# Patient Record
Sex: FEMALE | URBAN - METROPOLITAN AREA
[De-identification: names, ages, dates, MRNs, and addresses within clinical notes are randomized per-mention and may not be internally consistent; named-entity substitution may affect disease eponyms.]

---
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
## 2024-05-20 ENCOUNTER — OFFICE VISIT (OUTPATIENT)
Dept: INTERNAL MEDICINE | Facility: CLINIC | Age: 19
End: 2024-05-20
Payer: COMMERCIAL

## 2024-05-20 VITALS
OXYGEN SATURATION: 100 % | DIASTOLIC BLOOD PRESSURE: 70 MMHG | BODY MASS INDEX: 20.49 KG/M2 | WEIGHT: 120 LBS | SYSTOLIC BLOOD PRESSURE: 118 MMHG | TEMPERATURE: 98 F | HEIGHT: 64 IN | HEART RATE: 70 BPM

## 2024-05-20 DIAGNOSIS — Z12.4 SCREENING FOR CERVICAL CANCER: ICD-10-CM

## 2024-05-20 DIAGNOSIS — R41.840 LACK OF CONCENTRATION: Primary | ICD-10-CM

## 2024-05-20 PROCEDURE — 99203 OFFICE O/P NEW LOW 30 MIN: CPT | Performed by: NURSE PRACTITIONER

## 2024-05-20 NOTE — PROGRESS NOTES
"Subjective   Chief Complaint   Patient presents with    [facility]    concerned she may have ADHD       Aleida Strong is a 18 y.o. female presents with a concern for ADHD.  She states that anyone she has ever come in contact with has told her that she likely has ADHD but she has never been diagnosed.  She's uncertain if she has any hyperactivity.  Has a hard time focusing and completing task.  Gets off track easily.  She denies any depression or anxiety.        Review of Systems   Constitutional:  Negative for activity change, appetite change and fatigue.   HENT:  Negative for congestion.    Respiratory:  Negative for cough and shortness of breath.    Cardiovascular:  Negative for chest pain and leg swelling.   Gastrointestinal:  Negative for abdominal pain.   Neurological:  Negative for dizziness, weakness and confusion.   Psychiatric/Behavioral:  Positive for decreased concentration. Negative for behavioral problems, dysphoric mood, negative for hyperactivity and depressed mood. The patient is not nervous/anxious.        Past Medical History:   Diagnosis Date    Acid reflux     Eczema      Past Surgical History:   Procedure Laterality Date    KNEE SURGERY      WISDOM TOOTH EXTRACTION       Family History   Problem Relation Age of Onset    No Known Problems Mother     Hypertension Father     Learning disabilities Paternal Grandmother     Hypertension Paternal Grandmother      Social History     Tobacco Use   Smoking Status Never   Smokeless Tobacco Never      Social History     Substance and Sexual Activity   Alcohol Use Yes    Comment: Rare      No current outpatient medications on file prior to visit.     No current facility-administered medications on file prior to visit.     No Known Allergies    Objective   Vitals:    05/20/24 0815   BP: 118/70   BP Location: Left arm   Patient Position: Sitting   Pulse: 70   Temp: 98 °F (36.7 °C)   SpO2: 100%   Weight: 54.4 kg (120 lb)   Height: 162.6 cm (64\")     Body " mass index is 20.6 kg/m².    Physical Exam  Vitals and nursing note reviewed.   Constitutional:       Appearance: Normal appearance. She is normal weight.   HENT:      Head: Normocephalic.   Eyes:      Pupils: Pupils are equal, round, and reactive to light.   Cardiovascular:      Rate and Rhythm: Normal rate and regular rhythm.      Pulses: Normal pulses.      Heart sounds: Normal heart sounds.   Pulmonary:      Effort: Pulmonary effort is normal. No respiratory distress.      Breath sounds: Normal breath sounds.   Skin:     General: Skin is warm and dry.      Capillary Refill: Capillary refill takes less than 2 seconds.   Neurological:      General: No focal deficit present.      Mental Status: She is alert and oriented to person, place, and time.      Gait: Gait is intact.   Psychiatric:         Attention and Perception: Attention normal.         Mood and Affect: Mood normal.         Behavior: Behavior normal.         Thought Content: Thought content normal.         Judgment: Judgment normal.       BMI is within normal parameters. No other follow-up for BMI required.       Assessment & Plan   Problem List Items Addressed This Visit    None  Visit Diagnoses       Lack of concentration    -  Primary    Relevant Orders    Ambulatory Referral to Behavioral Health (Completed)    Screening for cervical cancer        Relevant Orders    Ambulatory Referral to Obstetrics / Gynecology           Refer to behavioral health  Pt does not wish to have labs done at this time  Refer for pap    No current outpatient medications on file.       Plan of care reviewed with the patient at the conclusion of today's visit.  Education was provided regarding diagnosis, management, and any prescribed or recommended OTC medications.  Patient verbalized understanding of and agreement with management plan.       AMY Carias

## 2024-06-04 ENCOUNTER — TELEPHONE (OUTPATIENT)
Dept: INTERNAL MEDICINE | Facility: CLINIC | Age: 19
End: 2024-06-04

## 2024-06-04 NOTE — TELEPHONE ENCOUNTER
Caller: AVERY SEAMAN    Relationship: Mother    Best call back number: [phone]     What is the best time to reach you: ANYTIME    Who are you requesting to speak with (clinical staff, provider,  specific staff member):     Do you know the name of the person who called: NA    What was the call regarding: PATIENT MOTHER CALLING AND STATED THEY OFFICE VISIT ON 05/20/2024 WAS CODED WRONG. PATIENT MOTHER CONTACTED THE BILLING DEPARTMENT AND THEY STATED THAT IT WAS THE OFFICE THAT CODED IT WRONG. PLEASE CALL TO DISCUSS.     Is it okay if the provider responds through MyChart: NO

## 2024-06-04 NOTE — TELEPHONE ENCOUNTER
Called left  for someone to call back    Reviewing the note and billing was correct. 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 It was not a physical or annual exam

## 2024-08-05 ENCOUNTER — TELEPHONE (OUTPATIENT)
Dept: INTERNAL MEDICINE | Facility: CLINIC | Age: 19
End: 2024-08-05
Payer: COMMERCIAL

## 2024-08-05 NOTE — TELEPHONE ENCOUNTER
-Mother left message regarding the bill  -Sent a request to bill as a physical 6/4  -Had Ayde review with me, it had not been rebilled as a physical.  - I emailed Marta Medina and asked her to rebill as a physical per patients call requesting a physical for the appt visit when scheduling.   -called and spoke to Aleida and updated her.

## 2024-11-11 NOTE — PROGRESS NOTES
Subjective   Chief Complaint   Patient presents with    Gynecologic Exam     New GYN, [facility].  1st GYN exam.      Aleida Strong is a 19 y.o. year old  presenting to be seen for her annual exam.  She has completed her HPV vaccine series.  She is interested in discussing birth control options today.  She is not ready to start hormonal birth control but has never used any birth control in the past and would like to know options.  She is currently going through a break-up with her long-term boyfriend.  This is the same partner she was with when they had an EAB.  This was in 2022.  She states she feels like she has not fully processed the emotional burden of this procedure.  She does voice some regret.  She has not been to counseling but is interested in this.  She is also concerned she may have ADHD.  She is not interested in medication but would like a formal diagnosis if she does have ADHD.  She is working as a .  Her and her boyfriend are  as they have different life goals.  She ideally would like to live on a farm and have a farm lifestyle that she grew up [place] farm.  He is currently [place] and is planning to go to [employer] and she just feels they have different interest at this time.  Although the break-up is amicable she is having conflicting feelings about this decision.  She did have unprotected intercourse about a week ago.  She is concern for possible pregnancy but understands that it is too early to test.  SEXUAL Hx:  She is currently sexually active.  In the past year there there has been NO new sexual partners.    Condoms are never used.  She would not like to be screened for STD's at today's exam.  Current birth control method: not using any form of contraception and does not wish to get pregnant.  She is not happy with her current method of contraception and does want to discuss alternative methods of contraception.  MENSTRUAL Hx:  Patient's  "last menstrual period was 10/21/2024 (exact date).  In the past 6 months her cycles have been regular, predictable and occur monthly.  Her menstrual flow is typically normal.   Each month on average there are roughly 0 day(s) of very heavy flow.    Intermenstrual bleeding is absent.    Post-coital bleeding is absent.  Dysmenorrhea: is not affecting her activities of daily living  PMS: is not affecting her activities of daily living  Her cycles are not a source of concern for her that she wishes to discuss today.  HEALTH Hx:     She has concerns about domestic violence: no.  OTHER THINGS SHE WANTS TO DISCUSS TODAY:  Nothing else    The following portions of the patient's history were reviewed and updated as appropriate:problem list, current medications, allergies, past family history, past medical history, past social history, and past surgical history.    Social History    Tobacco Use      Smoking status: Never      Smokeless tobacco: Never    Review of Systems  Constitutional POS: nothing reported    NEG: anorexia or night sweats   Genitourinary POS: nothing reported    NEG: dysuria or hematuria      Gastointestinal POS: nothing reported    NEG: bloating, change in bowel habits, melena, or reflux symptoms   Integument POS: nothing reported    NEG: moles that are changing in size, shape, color or rashes   Breast POS: nothing reported    NEG: persistent breast lump, skin dimpling, or nipple discharge        Objective   BP 92/62   Ht 164.5 cm (64.75\")   Wt 55.1 kg (121 lb 6.4 oz)   LMP 10/21/2024 (Exact Date)   Breastfeeding No   BMI 20.36 kg/m²     General:  well developed; well nourished  no acute distress  mentation appropriate   Skin:  No suspicious lesions seen   Thyroid: normal to inspection and palpation   Breasts:  Examined in supine position  Symmetric without masses or skin dimpling  Nipples normal without inversion, lesions or discharge  There are no palpable axillary nodes   Abdomen: soft, non-tender; " no masses  no umbilical or inguinal hernias are present  no hepato-splenomegaly   Pelvis: Clinical staff was present for exam  :  urethral meatus normal;  Vaginal:  normal pink mucosa without prolapse or lesions.  Cervix:  normal appearance.  Uterus:  normal size, shape and consistency.  Adnexa:  normal bimanual exam of the adnexa.  Rectal:  digital rectal exam not performed; anus visually normal appearing.        Assessment   Well woman with routine gynecological exam  Has completed HPV vaccine series.  Contraceptive education     Plan     Pap was done today.  If she does not receive the results of the Pap within 2 weeks  time, she was instructed to call to find out the results.  I explained to Aleida that the recommendations for Pap smear interval in a low risk patient's has lengthened to 3 years time.  I encouraged her to be seen yearly for a full physical exam including breast and pelvic exam even during the off years when PAP's will not be performed.  Discussed hormonal and nonhormonal birth control options with patient.  She will consider options and notify provider if she would like to start any contraception.  No contraindications to ASUNCION's identified discussed continued use of condoms to decrease risk of STI transmission.  Referral sent for mental health provider.  [facility] given for [facility].  The importance of keeping all planned follow-up and taking all medications as prescribed was emphasized.  Today I discussed with Aleida the total recommended calcium intake for a premenopausal female is 1000 mg.  Ideally this should be from dietary sources.  I reviewed calcium content in various foods including milk, fortified orange juice and yogurt.  If she cannot get sufficient calcium through dietary means, it is recommended to supplement with either a multivitamin or calcium to reach her daily goal.  I also reviewed the difference in the bioavailability of calcium carbonate and calcium  citrate containing supplements and the importance of taking calcium carbonate containing products with food.  Finally, vitamin D's role in calcium absorption was reviewed and a total daily vitamin D intake of 800 units was recommended.  She has completed her HPV vaccine series  Follow up for annual exam 1 year  If she chooses to start hormonal contraception will follow up in office in 3 months after medication start.    No orders of the defined types were placed in this encounter.         This note was electronically signed.    Sharon Subramanian, AMY  November 15, 2024

## 2024-11-15 ENCOUNTER — OFFICE VISIT (OUTPATIENT)
Dept: OBSTETRICS AND GYNECOLOGY | Facility: CLINIC | Age: 19
End: 2024-11-15
Payer: COMMERCIAL

## 2024-11-15 VITALS
WEIGHT: 121.4 LBS | SYSTOLIC BLOOD PRESSURE: 92 MMHG | HEIGHT: 65 IN | BODY MASS INDEX: 20.23 KG/M2 | DIASTOLIC BLOOD PRESSURE: 62 MMHG

## 2024-11-15 DIAGNOSIS — Z01.419 WELL WOMAN EXAM WITH ROUTINE GYNECOLOGICAL EXAM: Primary | ICD-10-CM

## 2024-11-15 DIAGNOSIS — Z71.1 MENTAL HEALTH-RELATED COMPLAINT: ICD-10-CM

## 2024-11-15 RX ORDER — RUXOLITINIB 15 MG/G
1 CREAM TOPICAL AS NEEDED
COMMUNITY

## 2024-11-18 LAB — REF LAB TEST METHOD: NORMAL

## 2024-12-19 ENCOUNTER — TELEMEDICINE (OUTPATIENT)
Dept: PSYCHIATRY | Facility: CLINIC | Age: 19
End: 2024-12-19
Payer: COMMERCIAL

## 2024-12-19 DIAGNOSIS — F43.21 GRIEF: ICD-10-CM

## 2024-12-19 DIAGNOSIS — F41.1 GENERALIZED ANXIETY DISORDER: Primary | ICD-10-CM
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
## 2025-01-22 PROBLEM — F41.1 GENERALIZED ANXIETY DISORDER: Status: ACTIVE | Noted: 2025-01-22

## 2025-01-22 PROBLEM — F43.21 GRIEF: Status: ACTIVE | Noted: 2025-01-22

## 2025-01-27 ENCOUNTER — TELEMEDICINE (OUTPATIENT)
Dept: PSYCHIATRY | Facility: CLINIC | Age: 20
End: 2025-01-27
Payer: COMMERCIAL

## 2025-01-27 DIAGNOSIS — F41.1 GENERALIZED ANXIETY DISORDER: Primary | ICD-10-CM

## 2025-01-27 NOTE — PLAN OF CARE
Refer to care plan   Multi-Disciplinary Problems (from Behavioral Health Treatment Plan)      Active Problems       Problem: Anxiety  Start Date: 01/27/25      Problem Details: The patient self-scales this problem as a 9 with 10 being the worst; patient states that she will become so anxious that she gets nauseated and will overthink everything and feel uncomfortable in her own skin.          Goal Priority Start Date Expected End Date End Date    Patient will develop and implement behavioral and cognitive strategies to reduce anxiety and irrational fears. -- 01/27/25 07/28/25 --    Goal Details: Progress toward goal:  Not appropriate to rate progress toward goal since this is the initial treatment plan.          Goal Intervention Frequency Start Date End Date    Help patient explore past emotional issues in relation to present anxiety. Q2 Weeks 01/27/25 --    Intervention Details: Duration of treatment until discharged.          Goal Intervention Frequency Start Date End Date    Help patient develop an awareness of their cognitive and physical responses to anxiety. Q2 Weeks 01/27/25 --    Intervention Details: Duration of treatment until discharged.                  Problem: Ineffective Communication  Start Date: 01/27/25      Problem Details: The patient self-scales this problem as a 8 with 10 being the worst; patient states that she overshares and tries to respond quickly to any communication from others and this can be very frustrating for the patient when she is not given the same type of communication.          Goal Priority Start Date Expected End Date End Date    Patient will demonstrate the ability to initiate new communication patterns outside of sessions on a consistent basis. -- 01/27/25 07/28/25 --    Goal Details: Progress toward goal:  Not appropriate to rate progress toward goal since this is the initial treatment plan.          Goal Intervention Frequency Start Date End Date    Encourage  understanding of past experiences that affect current communication style and educate about healthy communication patterns. Q2 Weeks 01/27/25 --    Intervention Details: Duration of treatment until discharged.                  Problem: Relationship Issues  Start Date: 01/27/25      Problem Details: The patient self-scales this problem as a 5 with 10 being the worst; patient struggles with boundaries and what is and isn't appropriate for her to share things and trying to be more considerate of others.          Goal Priority Start Date Expected End Date End Date    Patient will initiate personal change to improve relationships. -- 01/27/25 07/28/25 --    Goal Details: Progress toward goal:  Not appropriate to rate progress toward goal since this is the initial treatment plan.          Goal Intervention Frequency Start Date End Date    Assist patient in identifying behaviors that focus on relationship building and process the changes needed to improve relationships. Q2 Weeks 01/27/25 --    Intervention Details: Duration of treatment until discharged.                          Reviewed By       Ellie Ledbetter LPCC 01/27/25 1122
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
## 2025-01-27 NOTE — TREATMENT PLAN
Multi-Disciplinary Problems (from Behavioral Health Treatment Plan)      Active Problems       Problem: Anxiety  Start Date: 01/27/25      Problem Details: The patient self-scales this problem as a 9 with 10 being the worst; patient states that she will become so anxious that she gets nauseated and will overthink everything and feel uncomfortable in her own skin.          Goal Priority Start Date Expected End Date End Date    Patient will develop and implement behavioral and cognitive strategies to reduce anxiety and irrational fears. -- 01/27/25 07/28/25 --    Goal Details: Progress toward goal:  Not appropriate to rate progress toward goal since this is the initial treatment plan.          Goal Intervention Frequency Start Date End Date    Help patient explore past emotional issues in relation to present anxiety. Q2 Weeks 01/27/25 --    Intervention Details: Duration of treatment until discharged.          Goal Intervention Frequency Start Date End Date    Help patient develop an awareness of their cognitive and physical responses to anxiety. Q2 Weeks 01/27/25 --    Intervention Details: Duration of treatment until discharged.                  Problem: Ineffective Communication  Start Date: 01/27/25      Problem Details: The patient self-scales this problem as a 8 with 10 being the worst; patient states that she overshares and tries to respond quickly to any communication from others and this can be very frustrating for the patient when she is not given the same type of communication.          Goal Priority Start Date Expected End Date End Date    Patient will demonstrate the ability to initiate new communication patterns outside of sessions on a consistent basis. -- 01/27/25 07/28/25 --    Goal Details: Progress toward goal:  Not appropriate to rate progress toward goal since this is the initial treatment plan.          Goal Intervention Frequency Start Date End Date    Encourage understanding of past experiences  that affect current communication style and educate about healthy communication patterns. Q2 Weeks 01/27/25 --    Intervention Details: Duration of treatment until discharged.                  Problem: Relationship Issues  Start Date: 01/27/25      Problem Details: The patient self-scales this problem as a 5 with 10 being the worst; patient struggles with boundaries and what is and isn't appropriate for her to share things and trying to be more considerate of others.          Goal Priority Start Date Expected End Date End Date    Patient will initiate personal change to improve relationships. -- 01/27/25 07/28/25 --    Goal Details: Progress toward goal:  Not appropriate to rate progress toward goal since this is the initial treatment plan.          Goal Intervention Frequency Start Date End Date    Assist patient in identifying behaviors that focus on relationship building and process the changes needed to improve relationships. Q2 Weeks 01/27/25 --    Intervention Details: Duration of treatment until discharged.                          Reviewed By       Ellie Ledbetter LPCC 01/27/25 [id number]                     I have discussed and reviewed this treatment plan with the patient.

## 2025-02-18 ENCOUNTER — TELEMEDICINE (OUTPATIENT)
Dept: PSYCHIATRY | Facility: CLINIC | Age: 20
End: 2025-02-18
Payer: COMMERCIAL

## 2025-02-18 DIAGNOSIS — F41.1 GENERALIZED ANXIETY DISORDER: Primary | ICD-10-CM
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
## 2025-03-05 ENCOUNTER — TELEMEDICINE (OUTPATIENT)
Dept: PSYCHIATRY | Facility: CLINIC | Age: 20
End: 2025-03-05
Payer: COMMERCIAL

## 2025-03-05 DIAGNOSIS — F41.1 GENERALIZED ANXIETY DISORDER: Primary | ICD-10-CM

## 2025-03-05 DIAGNOSIS — F33.0 MILD EPISODE OF RECURRENT MAJOR DEPRESSIVE DISORDER: ICD-10-CM

## 2025-03-05 PROCEDURE — 90837 PSYTX W PT 60 MINUTES: CPT | Performed by: COUNSELOR

## 2025-03-05 NOTE — PROGRESS NOTES
Date: March 17, 2025  Time In: 10:26 AM  Time Out: 11:30 AM   This provider is located at the [facility]), [address], [place] [address] using a secure [employer] Video Visit through [employer]. Patient is being seen remotely via telehealth at home address in [place] and stated they are in a secure environment for this session. The patient's condition being diagnosed/treated is appropriate for telemedicine. The provider identified herself as well as her credentials. The patient, and/or patients guardian, consent to be seen remotely, and when consent is given they understand that the consent allows for patient identifiable information to be sent to a third party as needed. They may refuse to be seen remotely at any time. The electronic data is encrypted and password protected, and the patient and/or guardian has been advised of the potential risks to privacy not withstanding such measures.   You have chosen to receive care through a telehealth visit.  Do you consent to use a video/audio connection for your medical care today? Yes  Mode of Visit: Video  Location of patient: -HOME-  Location of provider: +HOME+  You have chosen to receive care through a telehealth visit.  The patient has signed the video visit consent form.  The visit included audio and video interaction. No technical issues occurred during this visit.    PROGRESS NOTE  Data:  Aleida Strong is a 19 y.o. female who presents today for follow up. Patient states that she had a good trip with her father and didn't want to have to come back to [place]. Patient states that since she has been back she has been pretty busy at work. Patient states that she has a wedding scheduled this weekend but states that as the months go on things will get busier for her. 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 Patient states that she loves the layout and the [place] of the land and feels that it could be a good fit for her some day. Patient states that while she was gone on her trip she messaged with her boyfriend while she was traveling but states that during the trip she stayed busy with spending time with her father. Patient states that while she was on the trip another friend told him that a cedrick from a local basketball team had messaged the patient while they were not really talking and was trying to connect with her under the impression of trying to get an appointment for a haircut but then it turned to the man trying to hook up with her which the patient rejected the advances but didn't tell her estranged boyfriend about the interaction.  Patient states that she is not mad that the friend told her estranged boyfriend about the situation but states that she was surprised to learn that he would have wanted to know.  Patient states that the more they talk about things the more she realizes that she is looking for something different than what he can give her but she becomes emotional when she thinks of giving up contact with her estranged boyfriend and losing their friendship as that is what matters most to her.  Patient discussed that she did not know how she would feel if either of them were to move on but she states that realistically she knows that he is not going to want the same  things that she does and that they are only going to be more and more distant as time goes on if they cannot determine how to be friends and not allow feelings and emotions from their past relationship and situations to hander their ability to remain on a friendship only level.    Chief Complaint: feelings of anxiety, scheduling issues, relationship issues, future planning    History of Present Illness: Patient has struggled with anxiety and depression for several years    Clinical Maneuvering/Intervention: solution focused    (Scales based on 0 - 10 with 10 being the worst)  Depression: 0 Anxiety: 4       Assisted patient in processing above session content; acknowledged and normalized patient’s thoughts, feelings, and concerns.  Rationalized patient thought process regarding work related issues, future planning and the status of her relationship with her estranged boyfriend.  Discussed triggers associated with patient's feelings and emotions tied to her relationship with her estranged boyfriend and her wants and plans for her future.  Also discussed coping skills for patient to implement such as really defining for herself what she wants for her future, having a conversation about future goals with her estranged boyfriend, and working with her estranged boyfriend to determine what is best for both of them in regard to carrying out a relationship or friendship and what each of them needs in order for that selection to occur.    Allowed patient to freely discuss issues without interruption or judgment. Provided safe, confidential environment to facilitate the development of positive therapeutic relationship and encourage open, honest communication. Assisted patient in identifying risk factors which would indicate the need for higher level of care including thoughts to harm self or others and/or self-harming behavior and encouraged patient to contact this office, call 911, or present to the nearest emergency room  "should any of these events occur. Discussed crisis intervention services and means to access. Patient adamantly and convincingly denies current suicidal or homicidal ideation or perceptual disturbance.    Assessment:   Assessment   Patient appears to maintain relative stability as compared to their baseline.  However, patient continues to struggle with anxiety and depression which continues to cause impairment in important areas of functioning.  A result, they can be reasonably expected to continue to benefit from treatment and would likely be at increased risk for decompensation otherwise.    Mental Status Exam:   Hygiene:   good  Cooperation:  Cooperative  Eye Contact:  Good  Psychomotor Behavior:  Appropriate  Affect:  Appropriate  Mood:  \"confused\" per patient   Speech:  Normal  Thought Process:  Goal directed  Thought Content:  Normal  Suicidal:  None  Homicidal:  None  Hallucinations:  None  Delusion:  None  Memory:  Intact  Orientation:  Person, Place, Time, and Situation  Reliability:  good  Insight:  Good  Judgement:  Good  Impulse Control:  Good  Physical/Medical Issues:  No        Patient's Support Network Includes:  parents, extended family, and friends    Functional Status: Moderate impairment     Progress toward goal: Not at goal    Prognosis: Good with Ongoing Treatment          Plan:    Patient will continue in individual outpatient therapy with focus on improved functioning and coping skills, maintaining stability, and avoiding decompensation and the need for higher level of care.    Patient will adhere to medication regimen as prescribed and report any side effects. Patient will contact this office, call 911 or present to the nearest emergency room should suicidal or homicidal ideations occur. Provide Cognitive Behavioral Therapy and Solution Focused Therapy to improve functioning, maintain stability, and avoid decompensation and the need for higher level of care.     Return in about 3 weeks, or " earlier if symptoms worsen or fail to improve.           VISIT DIAGNOSIS:     ICD-10-CM ICD-9-CM   1. Generalized anxiety disorder  F41.1 300.02   2. Mild episode of recurrent major depressive disorder  F33.0 296.31             This document has been electronically signed by HYUN Hernández  March 17, 2025 11:01 [facility] No Show Policy:  We understand unexpected circumstances arise; however, anytime you miss your appointment we are unable to provide you appropriate care.  In addition, each appointment missed could have been used to provide care for others.  We ask that you call at least 24 hours in advance to cancel or reschedule an appointment.  We would like to take this opportunity to remind you of our policy stating patients who miss THREE or more appointments without cancelling or rescheduling 24 hours in advance of the appointment may be subject to cancellation of any further visits with our clinic and recommendation to seek in-person services/visits.    Please call [phone] to reschedule your appointment. If there are reasons that make it difficult for you to keep the appointments, please call and let us know how we can help.  Please understand that medication prescribing will not continue without seeing your provider.      BEHAVIORAL HEALTH RESOURCE CONNECTION      If you or a family member are not sure where to start, calling   the [facility] Behavioral Health Resource Connection is   a great place to begin. The resource line is dedicated to   providing behavioral health resources to residents of [place]   and [facility], seven days a week, 7 a.m.-7 p.m.    Behavioral Health Resource Connection  [phone]      Part of this note may be an electronic transcription/translation of spoken language to printed text using the Dragon Dictation System.

## 2025-03-27 ENCOUNTER — TELEPHONE (OUTPATIENT)
Dept: PSYCHIATRY | Facility: CLINIC | Age: 20
End: 2025-03-27
Payer: COMMERCIAL

## 2025-03-27 NOTE — TELEPHONE ENCOUNTER
Unable to reach patient by phone, sent a [facility] message making her aware that appointment with Ellie Ledbetter for 03/31/25 has been canceled, provider will be out office. Also reminded patient of her next scheduled appointment date and time in message.

## 2025-04-16 ENCOUNTER — TELEMEDICINE (OUTPATIENT)
Dept: PSYCHIATRY | Facility: CLINIC | Age: 20
End: 2025-04-16
Payer: COMMERCIAL

## 2025-04-16 DIAGNOSIS — F41.1 GENERALIZED ANXIETY DISORDER: Primary | ICD-10-CM

## 2025-04-16 PROCEDURE — 90837 PSYTX W PT 60 MINUTES: CPT | Performed by: COUNSELOR

## 2025-04-16 NOTE — PROGRESS NOTES
Date: April 29, 2025  Time In: 10:16 AM   Time Out: 11:26 AM  This provider is located at the [facility] ([facility]), [address], [place] [address] using a secure [employer] Video Visit through [employer]. Patient is being seen remotely via telehealth at home address in [place] and stated they are in a secure environment for this session. The patient's condition being diagnosed/treated is appropriate for telemedicine. The provider identified herself as well as her credentials. The patient, and/or patients guardian, consent to be seen remotely, and when consent is given they understand that the consent allows for patient identifiable information to be sent to a third party as needed. They may refuse to be seen remotely at any time. The electronic data is encrypted and password protected, and the patient and/or guardian has been advised of the potential risks to privacy not withstanding such measures.   You have chosen to receive care through a telehealth visit.  Do you consent to use a video/audio connection for your medical care today? Yes  Mode of Visit: Video  Location of patient: -HOME-  Location of provider: +HOME+  You have chosen to receive care through a telehealth visit.  The patient has signed the video visit consent form.  The visit included audio and video interaction. No technical issues occurred during this visit.    PROGRESS NOTE  Data:  Aleida Strong is a 19 y.o. female who presents today for follow up.  Patient states that she has been working a lot and she is getting ready for wedding season which is fun but overwhelming. Patient states that April and May are harder due to holidays and family events and the patient states that she tries to stay up on her household chores. Patient states that the extra classes for work are falling on Friday's and that will last over a year which is frustrating to her but she understands that it is just part of  the training. Patient states that she is still trying to weigh out possibilities of what she wants to do for her future while trying to get through these next few months.  Patient is still contemplating a move to [place] and did go visit there a couple of weekends ago and enjoyed herself but has also been feeling overly anxious about the idea of what will become of relationship between her and her ex- boyfriend whom she is still very much connected to. Per patient, her ex boyfriend now wants to get back together but she is not sure that this is the best decision for her given the options for her future that she is looking at. Discussed the patient making her self care a priority during this busy season and being honest with herself and others about what she wants and needs in her future.  Discussed the patient coming up with a plan for her living expenses and other things that she feels that she may need in the future and comparing them to where she is currently at both professionally and financially in comparison to where she wants to be.  Discussed the patient having more open communication with her ex-boyfriend about where things had gone wrong between them before and if those particular things can be worked out and how each of them feel like this time apart has influenced their abilities to reconnect in a positive manner or how this situation has been potentially a point of learning for both of them in regard to the top of relationship that each are seeking.      Chief Complaint: feelings of anxiety, feeling overwhelmed with work and decision making    History of Present Illness: Patient has struggled with anxiety for several years.      Clinical Maneuvering/Intervention: solution focused    (Scales based on 0 - 10 with 10 being the worst)  Depression: 0 Anxiety: 5       Assisted patient in processing above session content; acknowledged and normalized patient’s thoughts, feelings, and concerns.  Rationalized  patient thought process regarding her busy schedule and trying to plan for her future.  Discussed triggers associated with patient's feelings of anxiety as they are linked to her work and personal life choices and planning for her future.  Also discussed coping skills for patient to implement such as asking her ex boyfriend what he wants in regard to a relationship with her and clearly defining for him what she is needing/wanting in a partner, prioritizing her self care and journaling her thought processes in regard to her future plans so that she can keep track of what she has been looking into and what information she has on individual topics.    Allowed patient to freely discuss issues without interruption or judgment. Provided safe, confidential environment to facilitate the development of positive therapeutic relationship and encourage open, honest communication. Assisted patient in identifying risk factors which would indicate the need for higher level of care including thoughts to harm self or others and/or self-harming behavior and encouraged patient to contact this office, call 911, or present to the nearest emergency room should any of these events occur. Discussed crisis intervention services and means to access. Patient adamantly and convincingly denies current suicidal or homicidal ideation or perceptual disturbance.    Assessment:   Assessment   Patient appears to maintain relative stability as compared to their baseline.  However, patient continues to struggle with anxiety which continues to cause impairment in important areas of functioning.  A result, they can be reasonably expected to continue to benefit from treatment and would likely be at increased risk for decompensation otherwise.    Mental Status Exam:   Hygiene:   good  Cooperation:  Cooperative  Eye Contact:  Good  Psychomotor Behavior:  Appropriate  Affect:  Appropriate  Mood: anxious a little per patient   Speech:  Normal and  Rapid  Thought Process:  Goal directed  Thought Content:  Normal  Suicidal:  None  Homicidal:  None  Hallucinations:  None  Delusion:  None  Memory:  Intact  Orientation:  Person, Place, Time, and Situation  Reliability:  good  Insight:  Good  Judgement:  Good  Impulse Control:  Good  Physical/Medical Issues:  Yes stye on her eye        Patient's Support Network Includes:  parents, extended family, and friends    Functional Status: Moderate impairment     Progress toward goal: Not at goal    Prognosis: Good with Ongoing Treatment          Plan:    Patient will continue in individual outpatient therapy with focus on improved functioning and coping skills, maintaining stability, and avoiding decompensation and the need for higher level of care.    Patient will adhere to medication regimen as prescribed and report any side effects. Patient will contact this office, call 911 or present to the nearest emergency room should suicidal or homicidal ideations occur. Provide Cognitive Behavioral Therapy and Solution Focused Therapy to improve functioning, maintain stability, and avoid decompensation and the need for higher level of care.     Return in about 3 weeks, or earlier if symptoms worsen or fail to improve.           VISIT DIAGNOSIS:     ICD-10-CM ICD-9-CM   1. Generalized anxiety disorder  F41.1 300.02             This document has been electronically signed by HYUN Hernández  April 29, 2025 09:59 T    [facility] No Show Policy:  We understand unexpected circumstances arise; however, anytime you miss your appointment we are unable to provide you appropriate care.  In addition, each appointment missed could have been used to provide care for others.  We ask that you call at least 24 hours in advance to cancel or reschedule an appointment.  We would like to take this opportunity to remind you of our policy stating patients who miss THREE or more appointments without cancelling or  rescheduling 24 hours in advance of the appointment may be subject to cancellation of any further visits with our clinic and recommendation to seek in-person services/visits.    Please call [phone] to reschedule your appointment. If there are reasons that make it difficult for you to keep the appointments, please call and let us know how we can help.  Please understand that medication prescribing will not continue without seeing your provider.      [facility]'s No Show Policy reviewed with patient at today's visit. Patient verbalized understanding of this policy. Discussed with patient that in the event that there are three or more no show visits, it will be recommended that they pursue in-person services/visits as noncompliance with telehealth visits indicates that patient is not an appropriate candidate for telemedicine and would likely be more appropriate for in-person services/visits. Patient verbalizes understanding and is agreeable to this.    BEHAVIORAL HEALTH RESOURCE CONNECTION      If you or a family member are not sure where to start, calling   the [facility] Behavioral Health Resource Connection is   a great place to begin. The resource line is dedicated to   providing behavioral health resources to residents of [place]   and [facility], seven days a week, 7 a.m.-7 p.m.    Behavioral Health Resource Connection  [phone]      Part of this note may be an electronic transcription/translation of spoken language to printed text using the Dragon Dictation System.

## 2025-05-13 ENCOUNTER — TELEMEDICINE (OUTPATIENT)
Dept: PSYCHIATRY | Facility: CLINIC | Age: 20
End: 2025-05-13
Payer: COMMERCIAL

## 2025-05-13 DIAGNOSIS — F41.1 GENERALIZED ANXIETY DISORDER: Primary | ICD-10-CM

## 2025-05-13 PROCEDURE — 90837 PSYTX W PT 60 MINUTES: CPT | Performed by: COUNSELOR

## 2025-05-13 NOTE — PROGRESS NOTES
Date: May 27, 2025  Time In: 4:35 PM  Time Out: 6:04 PM   This provider is located at the [facility] [place] [facility]), [address], [place] [address] using a secure Virtual [employer] Video Visit through [employer]. Patient is being seen remotely via telehealth at home address in [place] and stated they are in a secure environment for this session. The patient's condition being diagnosed/treated is appropriate for telemedicine. The provider identified herself as well as her credentials. The patient, and/or patients guardian, consent to be seen remotely, and when consent is given they understand that the consent allows for patient identifiable information to be sent to a third party as needed. They may refuse to be seen remotely at any time. The electronic data is encrypted and password protected, and the patient and/or guardian has been advised of the potential risks to privacy not withstanding such measures.   You have chosen to receive care through a telehealth visit.  Do you consent to use a video/audio connection for your medical care today? Yes  Mode of Visit: Video  Location of patient: -HOME-  Location of provider: +HOME+  You have chosen to receive care through a telehealth visit.  The patient has signed the video visit consent form.  The visit included audio and video interaction.   PROGRESS NOTE  Data:  Aleida Strong is a 19 y.o. female who presents today for follow up. Patient states that her face is still half numb from her dental appointment today as she had to have a few cavities filled. Patient states that she has been very busy with work but has Sunday off and plans to get caught up on some household tasks.  Patient states that she is having to do the classes at work which is taking a lot of what would be her free time. 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 Patient states that she is also taking walk in clients to try to build her client base but that things such as men's perms and some outlandish hair styles make the patient nervous as she states that she likes to work on the conservative side of things as oncoming states.  Patient states that she is always looking to learn more and wants to be able to provide her clots with the best service possible.  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  Patient states that she still feels like moving living at [place] would be a good thing for her but states that she does not want to move before January.  Patient states that she has been reflecting on things for the past several months and states that she enjoyed her trip to [place] with her father as they were able to enjoy the time together and also enjoyed drinking together which they did on the trip.  Patient states that she has started feeling a little regret from her trip to [place] with her parents as she stated that because she and her boyfriend were technically not together the time she did hang out with another cedrick while there and then felt guilty for hanging out with this person even though nothing really happened between them.  Patient states that she had also started thinking about things that bother her and states that she gets annoyed with people who overly engage in drinking or smoking but at the same time she does not want to leave people behind.  Patient stated that her former boyfriend is very jealous and she also feels as though played a part in the longevity of the relationship and now that she is no longer smoking she does not view things in the same manner.  Patient states that she is struggling with this newfound insight and awareness of things that truly bother her that she had never considered before.  Patient states that she  "realizes that her ex-boyfriend is her only reason for staying in [place] right now and states that she knows that he has not really changed and that they do not necessarily want the same things as the patient has struggled with the familiarity of her ex boyfriend versus [place]    Chief Complaint: feelings of anxiety, work stressors, relationship concerns, future planning    History of Present Illness: Patient has struggled with anxiety for several years    Clinical Maneuvering/Intervention: CBT    (Scales based on 0 - 10 with 10 being the worst)  Depression: 0 Anxiety: 4-7       Assisted patient in processing above session content; acknowledged and normalized patient’s thoughts, feelings, and concerns.  Rationalized patient thought process regarding work and relationship issues and how each plays a part in her future.  Discussed triggers associated with patient's feelings and emotions as they are related to her newfound insight about her previous relationship and her future and frustrations with things that she used to participate in.  Also discussed coping skills for patient to implement such as using \"I statements\" to describe her feelings and wants to her ex-boyfriend, defining for herself what she really needs in a relationship and stating out her career goals to help her align her choices with those goals.     Allowed patient to freely discuss issues without interruption or judgment. Provided safe, confidential environment to facilitate the development of positive therapeutic relationship and encourage open, honest communication. Assisted patient in identifying risk factors which would indicate the need for higher level of care including thoughts to harm self or others and/or self-harming behavior and encouraged patient to contact this office, call 911, or present to the nearest emergency room should any of these events occur. Discussed crisis intervention services and means to access. " "Patient adamantly and convincingly denies current suicidal or homicidal ideation or perceptual disturbance.    Assessment:   Assessment   Patient appears to maintain relative stability as compared to their baseline.  However, patient continues to struggle with anxiety which continues to cause impairment in important areas of functioning.  A result, they can be reasonably expected to continue to benefit from treatment and would likely be at increased risk for decompensation otherwise.    Mental Status Exam:   Hygiene:   good  Cooperation:  Cooperative  Eye Contact:  Good  Psychomotor Behavior:  Appropriate  Affect:  Appropriate  Mood:  \"productive\" per patient   Speech:  Normal  Thought Process:  Goal directed  Thought Content:  Normal  Suicidal:  None  Homicidal:  None  Hallucinations:  None  Delusion:  None  Memory:  Intact  Orientation:  Person, Place, Time, and Situation  Reliability:  good  Insight:  Good  Judgement:  Good  Impulse Control:  Good  Physical/Medical Issues:  No        Patient's Support Network Includes:  parents, extended family, and friends    Functional Status: Moderate impairment     Progress toward goal: Not at goal; showing progress    Prognosis: Good with Ongoing Treatment          Plan:    Patient will continue in individual outpatient therapy with focus on improved functioning and coping skills, maintaining stability, and avoiding decompensation and the need for higher level of care.    Patient will adhere to medication regimen as prescribed and report any side effects. Patient will contact this office, call 911 or present to the nearest emergency room should suicidal or homicidal ideations occur. Provide Cognitive Behavioral Therapy and Solution Focused Therapy to improve functioning, maintain stability, and avoid decompensation and the need for higher level of care.     Return in about 2 weeks, or earlier if symptoms worsen or fail to improve.           VISIT DIAGNOSIS:     ICD-10-CM " ICD-9-CM   1. Generalized anxiety disorder  F41.1 300.02             This document has been electronically signed by Ellie Ledbetter [facility]  May 27, 2025 21:20 EDT    [facility] No Show Policy:  We understand unexpected circumstances arise; however, anytime you miss your appointment we are unable to provide you appropriate care.  In addition, each appointment missed could have been used to provide care for others.  We ask that you call at least 24 hours in advance to cancel or reschedule an appointment.  We would like to take this opportunity to remind you of our policy stating patients who miss THREE or more appointments without cancelling or rescheduling 24 hours in advance of the appointment may be subject to cancellation of any further visits with our clinic and recommendation to seek in-person services/visits.    Please call [phone] to reschedule your appointment. If there are reasons that make it difficult for you to keep the appointments, please call and let us know how we can help.  Please understand that medication prescribing will not continue without seeing your provider.      BEHAVIORAL HEALTH RESOURCE CONNECTION    If you or a family member are not sure where to start, calling   the [facility] Resource Connection is   a great place to begin. The resource line is dedicated to   providing behavioral health resources to residents of [place]   and [facility], seven days a week, 7 a.m.-7 p.m.    Behavioral Health Resource Connection  [phone]      Part of this note may be an electronic transcription/translation of spoken language to printed text using the Dragon Dictation System.

## 2025-05-28 ENCOUNTER — TELEMEDICINE (OUTPATIENT)
Dept: PSYCHIATRY | Facility: CLINIC | Age: 20
End: 2025-05-28
Payer: COMMERCIAL

## 2025-05-28 DIAGNOSIS — F41.1 GENERALIZED ANXIETY DISORDER: Primary | ICD-10-CM

## 2025-05-28 NOTE — PROGRESS NOTES
Date: June 11, 2025  Time In: 10:26 AM   Time Out: 11:26 AM   This provider is located at the [facility] ([facility] [place] [facility]), [address], [place] [address] using a secure [employer] Video Visit through [employer]. Patient is being seen remotely via telehealth at home address in [place] and stated they are in a secure environment for this session. The patient's condition being diagnosed/treated is appropriate for telemedicine. The provider identified herself as well as her credentials. The patient, and/or patients guardian, consent to be seen remotely, and when consent is given they understand that the consent allows for patient identifiable information to be sent to a third party as needed. They may refuse to be seen remotely at any time. The electronic data is encrypted and password protected, and the patient and/or guardian has been advised of the potential risks to privacy not withstanding such measures.   You have chosen to receive care through a telehealth visit.  Do you consent to use a video/audio connection for your medical care today? Yes  Mode of Visit: Video  Location of patient: -HOME-  Location of provider: +HOME+  You have chosen to receive care through a telehealth visit.  The patient has signed the video visit consent form.  The visit included audio and video interaction.     PROGRESS NOTE  Data:  Aleida Strong is a 20 y.o. female who presents today for follow up. Patient states that she has made progress on everything that she had previously talked about. Patient states that she made a plan to talk with her ex-bofriend to discuss where they are with things. Patient states that they were able to have the conversation that needed to be had as he had started a new job and he inquired about her job and then they discussed where each of them are in life and what the future looks like for each of them.  Patient states that she has decided that she is going to  try to move to [place] in January to try out what that life would look like for her there. Discussed issues between her and her ex boyfriend and how he had tried to express that he has changed and wants to be more adult minded at this time. Patient states that she doesn't believe that he has made any changes and is sad about the way things have gone between them.  Discussed the patient's need to follow her dreams and do what she feels is best for her to do for herself.  Patient states that she has tried to explain this to her former boyfriend and has not closed the door on them getting back together in the future if by chance they reconnect. Patient states that he also asked her for the cat that they had adopted together but it was in his name and that while she was reluctant to do so she ended up setting up a situation that she was comfortable with for the transition of the cat. Patient continued to discuss how annoyed she was with her ex-boyfriend's actions and they agreed to refrain from further contact for awhile even though the patient states that it is hard to not talk to him based solely on their history.     Chief Complaint: feelings of anxiety and depression, relationship issues    History of Present Illness:  Patient has struggled with anxiety for several years       Clinical Maneuvering/Intervention: CBT    (Scales based on 0 - 10 with 10 being the worst)  Depression: 0 Anxiety: 4       Assisted patient in processing above session content; acknowledged and normalized patient’s thoughts, feelings, and concerns.  Rationalized patient thought process regarding issues with her ex-boyfriend.  Discussed triggers associated with patient's feelings of anxiety which are related to her feelings of anxiety such as letting her ex boyfriend take one of the cats, trying to stay focused on her personal goals and keeping up with her busy schedule.  Also discussed coping skills for patient to implement such as holding her  "boundaries with others, reaffirming her plans for the future and and ensuring that she takes time out of her busy schedule for herself.    Allowed patient to freely discuss issues without interruption or judgment. Provided safe, confidential environment to facilitate the development of positive therapeutic relationship and encourage open, honest communication. Assisted patient in identifying risk factors which would indicate the need for higher level of care including thoughts to harm self or others and/or self-harming behavior and encouraged patient to contact this office, call 911, or present to the nearest emergency room should any of these events occur. Discussed crisis intervention services and means to access. Patient adamantly and convincingly denies current suicidal or homicidal ideation or perceptual disturbance.    Assessment:   Assessment   Patient appears to maintain relative stability as compared to their baseline.  However, patient continues to struggle with anxiety which continues to cause impairment in important areas of functioning.  A result, they can be reasonably expected to continue to benefit from treatment and would likely be at increased risk for decompensation otherwise.    Mental Status Exam:   Hygiene:   good  Cooperation:  Cooperative  Eye Contact:  Good  Psychomotor Behavior:  Appropriate  Affect:  Appropriate  Mood: \"content but stressed\" per patient  Speech:  Normal and Rapid  Thought Process:  Goal directed  Thought Content:  Normal  Suicidal:  None  Homicidal:  None  Hallucinations:  None  Delusion:  None  Memory:  Intact  Orientation:  Person, Place, Time, and Situation  Reliability:  good  Insight:  Good  Judgement:  Good  Impulse Control:  Good  Physical/Medical Issues:  No        Patient's Support Network Includes:  parents, extended family, and friends    Functional Status: Moderate impairment     Progress toward goal: Not at goal    Prognosis: Good with Ongoing Treatment      "     Plan:    Patient will continue in individual outpatient therapy with focus on improved functioning and coping skills, maintaining stability, and avoiding decompensation and the need for higher level of care.    Patient will adhere to medication regimen as prescribed and report any side effects. Patient will contact this office, call 911 or present to the nearest emergency room should suicidal or homicidal ideations occur. Provide Cognitive Behavioral Therapy and Solution Focused Therapy to improve functioning, maintain stability, and avoid decompensation and the need for higher level of care.     Return in about 2 weeks, or earlier if symptoms worsen or fail to improve.           VISIT DIAGNOSIS:     ICD-10-CM ICD-9-CM   1. Generalized anxiety disorder  F41.1 300.02             This document has been electronically signed by HYUN Hernández  June 11, 2025 21:36 EDT    [facility] No Show Policy:  We understand unexpected circumstances arise; however, anytime you miss your appointment we are unable to provide you appropriate care.  In addition, each appointment missed could have been used to provide care for others.  We ask that you call at least 24 hours in advance to cancel or reschedule an appointment.  We would like to take this opportunity to remind you of our policy stating patients who miss THREE or more appointments without cancelling or rescheduling 24 hours in advance of the appointment may be subject to cancellation of any further visits with our clinic and recommendation to seek in-person services/visits.    Please call [phone] to reschedule your appointment. If there are reasons that make it difficult for you to keep the appointments, please call and let us know how we can help.  Please understand that medication prescribing will not continue without seeing your provider.      [facility]'s No Show Policy reviewed with patient at today's  visit. Patient verbalized understanding of this policy. Discussed with patient that in the event that there are three or more no show visits, it will be recommended that they pursue in-person services/visits as noncompliance with telehealth visits indicates that patient is not an appropriate candidate for telemedicine and would likely be more appropriate for in-person services/visits. Patient verbalizes understanding and is agreeable to this.    BEHAVIORAL HEALTH RESOURCE CONNECTION      If you or a family member are not sure where to start, calling   the [facility] Behavioral Health Resource Connection is   a great place to begin. The resource line is dedicated to   providing behavioral health resources to residents of [place]   and [facility], seven days a week, 7 a.m.-7 p.m.    Behavioral Health Resource Connection  [phone]      Part of this note may be an electronic transcription/translation of spoken language to printed text using the Dragon Dictation System.

## 2025-06-11 ENCOUNTER — TELEMEDICINE (OUTPATIENT)
Dept: PSYCHIATRY | Facility: CLINIC | Age: 20
End: 2025-06-11
Payer: COMMERCIAL

## 2025-06-11 DIAGNOSIS — F41.1 GENERALIZED ANXIETY DISORDER: Primary | ICD-10-CM
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
## 2025-06-25 ENCOUNTER — TELEMEDICINE (OUTPATIENT)
Dept: PSYCHIATRY | Facility: CLINIC | Age: 20
End: 2025-06-25
Payer: COMMERCIAL

## 2025-06-25 DIAGNOSIS — F41.1 GENERALIZED ANXIETY DISORDER: Primary | ICD-10-CM
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
## 2025-07-16 ENCOUNTER — TELEMEDICINE (OUTPATIENT)
Dept: PSYCHIATRY | Facility: CLINIC | Age: 20
End: 2025-07-16
Payer: COMMERCIAL

## 2025-07-16 DIAGNOSIS — F41.1 GENERALIZED ANXIETY DISORDER: Primary | ICD-10-CM
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
## 2025-07-18 ENCOUNTER — OFFICE VISIT (OUTPATIENT)
Dept: OBSTETRICS AND GYNECOLOGY | Facility: CLINIC | Age: 20
End: 2025-07-18
Payer: COMMERCIAL

## 2025-07-18 VITALS
BODY MASS INDEX: 20.25 KG/M2 | HEIGHT: 64 IN | DIASTOLIC BLOOD PRESSURE: 62 MMHG | SYSTOLIC BLOOD PRESSURE: 90 MMHG | WEIGHT: 118.6 LBS

## 2025-07-18 DIAGNOSIS — Z30.09 BIRTH CONTROL COUNSELING: Primary | ICD-10-CM

## 2025-07-18 NOTE — PROGRESS NOTES
Subjective   Chief Complaint   Patient presents with    Gynecologic Exam     Patient wants to discuss options for birth control      Aleida Strong is a 20 y.o. year old  presenting to be seen for birth control counseling.     History of Present Illness  The patient presents to discuss birth control options.    She is considering the use of an intrauterine device (IUD) for birth control but expresses fear about potential side effects. She has a history of anxiety related to previous pregnancy and often forgets to take her antibiotics, leading her to believe that oral contraceptives may not be the best option. She has discussed various birth control methods with her colleagues at the [employer] where she works and is leaning towards the IUD. She is concerned about potential skin reactions to Nexplanon due to her sensitive skin.     She has only taken medication for eczema in the past and had an adverse reaction to hydrocodone during a knee surgery, which caused her to faint. She also experienced a negative reaction to Dupixent, an injection for eczema, which made her feel unwell and caused a reaction at the injection site. She is hesitant to try oral medications or injections due to these past experiences. She wishes to continue having her menstrual cycle and is concerned about weight gain associated with certain birth control methods.     She has undergone one pelvic exam in the past, which was well-tolerated, although she experienced some stomach discomfort afterwards. She is not fearful of the IUD insertion procedure but is worried about potential side effects. She believes she has a high pain tolerance and is considering rescheduling the procedure due to a family commitment tonight. Her menstrual cycle is typically heavy at the start, often causing her to bleed through pads, and she is concerned about the possibility of heavier periods with certain birth control methods.         Objective   BP 90/62   Ht  "161.3 cm (63.5\")   Wt 53.8 kg (118 lb 9.6 oz)   LMP 07/09/2025   Breastfeeding No   BMI 20.68 kg/m²     General:  well developed; well nourished  no acute distress  mentation appropriate   Skin:  Not performed.   Thyroid: not examined   Breasts:  Not performed.   Abdomen: Not performed.   Pelvis: Not performed.         Assessment & Plan  1. Contraceptive management.  - The risks associated with pregnancy generally outweigh those linked to birth control for most individuals. The hormonal IUD was discussed as a viable option, with the understanding that it can be removed at any time if she decides to conceive before the 8-year period. The potential side effects of the hormonal IUD were also discussed, including the possibility of lighter menstrual cycles. The copper IUD was presented as an alternative, with the caveat that it may result in heavier and more painful periods. The insertion process for both types of IUDs was explained in detail. She was advised to abstain from sexual intercourse for approximately 2 weeks post-insertion. A follow-up appointment will be scheduled in 4 to 6 weeks for an ultrasound to ensure the IUD is correctly positioned. If she experiences significant bleeding or cramping in the interim, she should contact the office immediately.  - The patient will proceed with the hormonal IUD. She was advised to take 800 mg of ibuprofen before the procedure. A cervical block with lidocaine will be used to help with pain during the insertion. The patient will be scheduled for the IUD insertion and provided with a packet of information.        Follow up for Mirena IUD insertion.     No orders of the defined types were placed in this encounter.         This note was electronically signed.          Patient or patient representative verbalized consent for the use of Ambient Listening during the visit with  Angelita Dunaway MD for chart documentation. 7/18/2025  15:42 EDT    Angelita Dunaway MD  Obstetrics and " Gynecology  [facility]

## 2025-07-22 ENCOUNTER — OFFICE VISIT (OUTPATIENT)
Dept: OBSTETRICS AND GYNECOLOGY | Facility: CLINIC | Age: 20
End: 2025-07-22
Payer: COMMERCIAL

## 2025-07-22 VITALS
DIASTOLIC BLOOD PRESSURE: 70 MMHG | HEIGHT: 64 IN | BODY MASS INDEX: 20.69 KG/M2 | WEIGHT: 121.2 LBS | SYSTOLIC BLOOD PRESSURE: 104 MMHG

## 2025-07-22 DIAGNOSIS — Z30.430 ENCOUNTER FOR IUD INSERTION: Primary | ICD-10-CM

## 2025-07-22 LAB
B-HCG UR QL: NEGATIVE
EXPIRATION DATE: NORMAL
INTERNAL NEGATIVE CONTROL: NEGATIVE
INTERNAL POSITIVE CONTROL: POSITIVE
Lab: NORMAL

## 2025-07-22 NOTE — PROGRESS NOTES
IUD Insertion    Patient's last menstrual period was 07/09/2025.    Date of procedure:  7/22/2025    Risks and benefits discussed? yes  All questions answered? yes  Consents given by The patient  Written consent obtained? yes    Local anesthesia used:  yes - 3 cc's of  Meds; anesthesia local: 1% lidocaine    Procedure documentation:    After verifying the patient had a low probability of being pregnant (negative UPT in office) and met the criteria for insertion, a timeout was performed. A sterile speculum has placed and the cervix was cleansed with an antiseptic solution.  Vaginal discharge was scant. Cervical block performed with lidocaine. The anterior lip of the cervix was grasped with a tenaculum and the uterine cavity was gently sounded. There was mild difficulty passing the sound through the cervix.  Cervical dilation did need to be performed prior to placing the IUD.  The uterus was anteverted and sounded to 7 cms.  The Mirena was then prepared per the manufacturers instructions.    The Mirena was advanced to a point 2 cms from the fundus and then the arms were released from the sheath.  The device was advanced to the fundus and the device was released fully from the sheath. The string was cut 2 cms in length.  Bleeding from the cervix was scant.    She tolerated the procedure without any difficulty.     Post procedure instructions: It was reviewed that the Mirena will not alter the timing of when she bleeds but it may decrease the quantity of flow and cramps.  Roughly 30% of people will be amenorrheic over time.  Efficacy rate of 99.2% over 8 years was discussed.  Spontaneous expulsion rate of 1-2% was also discussed.  If she has any issue with fever or excessive bleeding or pain she is to call the office immediately.  Otherwise I would like to see her back in 6 weeks with an ultrasound to confirm correct placement.    This note was electronically signed.    Angelita Dunaway MD  Obstetrics and Gynecology  [facility]

## 2025-08-06 ENCOUNTER — TELEMEDICINE (OUTPATIENT)
Dept: PSYCHIATRY | Facility: CLINIC | Age: 20
End: 2025-08-06
Payer: COMMERCIAL

## 2025-08-06 DIAGNOSIS — F41.1 GENERALIZED ANXIETY DISORDER: Primary | ICD-10-CM

## 2025-08-19 ENCOUNTER — OFFICE VISIT (OUTPATIENT)
Dept: OBSTETRICS AND GYNECOLOGY | Facility: CLINIC | Age: 20
End: 2025-08-19
Payer: COMMERCIAL

## 2025-08-19 VITALS
DIASTOLIC BLOOD PRESSURE: 74 MMHG | SYSTOLIC BLOOD PRESSURE: 106 MMHG | WEIGHT: 124.6 LBS | BODY MASS INDEX: 21.27 KG/M2 | HEIGHT: 64 IN

## 2025-08-19 DIAGNOSIS — Z30.431 IUD CHECK UP: Primary | ICD-10-CM
